# Patient Record
Sex: FEMALE | Race: WHITE | URBAN - METROPOLITAN AREA
[De-identification: names, ages, dates, MRNs, and addresses within clinical notes are randomized per-mention and may not be internally consistent; named-entity substitution may affect disease eponyms.]

---

## 2023-09-12 ENCOUNTER — OFFICE (OUTPATIENT)
Dept: URBAN - METROPOLITAN AREA CLINIC 92 | Facility: CLINIC | Age: 78
Setting detail: OPHTHALMOLOGY
End: 2023-09-12
Payer: MEDICARE

## 2023-09-12 DIAGNOSIS — H40.031: ICD-10-CM

## 2023-09-12 DIAGNOSIS — H02.835: ICD-10-CM

## 2023-09-12 DIAGNOSIS — H16.223: ICD-10-CM

## 2023-09-12 DIAGNOSIS — H01.004: ICD-10-CM

## 2023-09-12 DIAGNOSIS — H01.001: ICD-10-CM

## 2023-09-12 DIAGNOSIS — H25.13: ICD-10-CM

## 2023-09-12 DIAGNOSIS — H02.831: ICD-10-CM

## 2023-09-12 DIAGNOSIS — H40.033: ICD-10-CM

## 2023-09-12 DIAGNOSIS — H40.032: ICD-10-CM

## 2023-09-12 DIAGNOSIS — H02.834: ICD-10-CM

## 2023-09-12 DIAGNOSIS — H02.832: ICD-10-CM

## 2023-09-12 PROCEDURE — 92020 GONIOSCOPY: CPT | Performed by: OPHTHALMOLOGY

## 2023-09-12 PROCEDURE — 92014 COMPRE OPH EXAM EST PT 1/>: CPT | Performed by: OPHTHALMOLOGY

## 2023-09-12 ASSESSMENT — KERATOMETRY
METHOD_AUTO_MANUAL: AUTO
OS_K1POWER_DIOPTERS: 42.75
OD_K2POWER_DIOPTERS: 42.75
OS_K2POWER_DIOPTERS: 42.75
OD_K1POWER_DIOPTERS: 42.00
OD_AXISANGLE_DEGREES: 167
OS_AXISANGLE_DEGREES: 090

## 2023-09-12 ASSESSMENT — REFRACTION_CURRENTRX
OS_AXIS: 180
OD_VPRISM_DIRECTION: SV
OS_SPHERE: +5.25
OS_VPRISM_DIRECTION: SV
OD_CYLINDER: +1.00
OD_SPHERE: +5.75
OD_AXIS: 147
OS_CYLINDER: +0.50
OD_OVR_VA: 20/
OS_OVR_VA: 20/

## 2023-09-12 ASSESSMENT — REFRACTION_AUTOREFRACTION
OD_SPHERE: +4.75
OD_AXIS: 150
OS_SPHERE: +2.25
OD_CYLINDER: +1.00
OS_AXIS: 178
OS_CYLINDER: +1.50

## 2023-09-12 ASSESSMENT — TEAR BREAK UP TIME (TBUT)
OS_TBUT: 1+ 2+
OD_TBUT: 1+ 2+

## 2023-09-12 ASSESSMENT — LID EXAM ASSESSMENTS
OD_BLEPHARITIS: RUL T
OS_BLEPHARITIS: LUL T

## 2023-09-12 ASSESSMENT — VISUAL ACUITY
OD_BCVA: 20/30+
OS_BCVA: 20/25-

## 2023-09-12 ASSESSMENT — SPHEQUIV_DERIVED
OD_SPHEQUIV: 5.25
OS_SPHEQUIV: 3

## 2023-09-12 ASSESSMENT — AXIALLENGTH_DERIVED
OD_AL: 22.0653
OS_AL: 22.7312

## 2023-09-12 ASSESSMENT — LID POSITION - DERMATOCHALASIS
OD_DERMATOCHALASIS: RLL RUL 1+ 2+
OS_DERMATOCHALASIS: LLL LUL 1+ 2+

## 2024-02-17 ENCOUNTER — EMERGENCY (EMERGENCY)
Facility: HOSPITAL | Age: 79
LOS: 1 days | Discharge: HOME | End: 2024-02-17
Admitting: EMERGENCY MEDICINE
Payer: MEDICARE

## 2024-02-17 VITALS
OXYGEN SATURATION: 98 % | SYSTOLIC BLOOD PRESSURE: 178 MMHG | DIASTOLIC BLOOD PRESSURE: 90 MMHG | TEMPERATURE: 98 F | HEART RATE: 86 BPM | RESPIRATION RATE: 16 BRPM

## 2024-02-17 VITALS
RESPIRATION RATE: 18 BRPM | OXYGEN SATURATION: 99 % | DIASTOLIC BLOOD PRESSURE: 80 MMHG | HEART RATE: 70 BPM | SYSTOLIC BLOOD PRESSURE: 192 MMHG | WEIGHT: 108.03 LBS | TEMPERATURE: 99 F

## 2024-02-17 DIAGNOSIS — S82.192A OTHER FRACTURE OF UPPER END OF LEFT TIBIA, INITIAL ENCOUNTER FOR CLOSED FRACTURE: ICD-10-CM

## 2024-02-17 DIAGNOSIS — M16.12 UNILATERAL PRIMARY OSTEOARTHRITIS, LEFT HIP: ICD-10-CM
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
## 2024-02-17 DIAGNOSIS — E03.9 HYPOTHYROIDISM, UNSPECIFIED: ICD-10-CM

## 2024-02-17 DIAGNOSIS — N83.8 OTHER NONINFLAMMATORY DISORDERS OF OVARY, FALLOPIAN TUBE AND BROAD LIGAMENT: ICD-10-CM

## 2024-02-17 DIAGNOSIS — I10 ESSENTIAL (PRIMARY) HYPERTENSION: ICD-10-CM

## 2024-02-17 DIAGNOSIS — M54.2 CERVICALGIA: ICD-10-CM
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
## 2024-02-17 LAB — GLUCOSE BLDC GLUCOMTR-MCNC: 94 MG/DL — SIGNIFICANT CHANGE UP (ref 70–99)

## 2024-02-17 PROCEDURE — 27530 TREAT KNEE FRACTURE: CPT | Mod: 54,LT

## 2024-02-17 PROCEDURE — 72125 CT NECK SPINE W/O DYE: CPT | Mod: 26,MH

## 2024-02-17 PROCEDURE — 73590 X-RAY EXAM OF LOWER LEG: CPT | Mod: 26,LT

## 2024-02-17 PROCEDURE — 72192 CT PELVIS W/O DYE: CPT | Mod: 26,MH

## 2024-02-17 PROCEDURE — 70450 CT HEAD/BRAIN W/O DYE: CPT | Mod: 26,MH

## 2024-02-17 PROCEDURE — 73502 X-RAY EXAM HIP UNI 2-3 VIEWS: CPT | Mod: 26,LT

## 2024-02-17 PROCEDURE — 99285 EMERGENCY DEPT VISIT HI MDM: CPT | Mod: 57

## 2024-02-17 RX ORDER — ACETAMINOPHEN 500 MG
650 TABLET ORAL ONCE
Refills: 0 | Status: COMPLETED | OUTPATIENT
Start: 2024-02-17 | End: 2024-02-17

## 2024-02-17 RX ORDER — AMLODIPINE BESYLATE 2.5 MG/1
5 TABLET ORAL ONCE
Refills: 0 | Status: COMPLETED | OUTPATIENT
Start: 2024-02-17 | End: 2024-02-17

## 2024-02-17 RX ORDER — DICLOFENAC SODIUM 75 MG/1
1 TABLET, DELAYED RELEASE ORAL
Qty: 15 | Refills: 0
Start: 2024-02-17

## 2024-02-17 RX ORDER — ACETAMINOPHEN 500 MG
2 TABLET ORAL
Qty: 20 | Refills: 0
Start: 2024-02-17

## 2024-02-17 RX ADMIN — Medication 650 MILLIGRAM(S): at 19:58

## 2024-02-17 RX ADMIN — AMLODIPINE BESYLATE 5 MILLIGRAM(S): 2.5 TABLET ORAL at 19:58

## 2024-02-17 RX ADMIN — Medication 650 MILLIGRAM(S): at 22:51

## 2024-02-17 NOTE — ED PROVIDER NOTE - PROGRESS NOTE DETAILS
received s/o from ZIA nuñez pending imaging. 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 ambulatory with steady gait, NV intact, +SILT, and FROM otherwise with soft compartment. ace wrap and knee immobilizer applied, cane provided, weight bear as tolerated, f/u with ortho and PMD, pt verbalized understanding   - ZIA Beckford received s/o from ZIA nuñez pending imaging. 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 ambulatory with steady gait, NV intact, +SILT, and FROM otherwise with soft compartment. ace wrap and knee immobilizer applied, cane provided, weight bear as tolerated. Incidental findings of L adnexal cyst noted on CT as well, pt reports no pelvic/abd pain with no change in urinary/bowel function noted, reports provided and instructed f/u with ortho and PMD/GYN, pt verbalized understanding   - ZIA Beckford

## 2024-02-17 NOTE — ED PROVIDER NOTE - PATIENT PORTAL LINK FT
You can access the FollowMyHealth Patient Portal offered by [facility] by registering at the following website: [email or web address]. By joining [employer]’s FollowMyHealth portal, you will also be able to view your health information using other applications (apps) compatible with our system.

## 2024-02-17 NOTE — ED PROVIDER NOTE - PHYSICAL EXAMINATION
VITAL SIGNS: I have reviewed nursing notes and confirm.  CONSTITUTIONAL: Well-developed; well-nourished; in no acute distress.  SKIN: Skin is warm and dry, no acute rash.  HEAD: Normocephalic; atraumatic.  NECK: Supple; +mild ttp along the lateral sides of the trapezius w/o midline spine ttp and FROM.  CARD: S1, S2 normal; no murmurs, gallops, or rubs. Regular rate and rhythm.  RESP: No wheezes, rales or rhonchi.  ABD: Soft; non-distended; non-tender; no rebound or guarding.  EXT: +mild ttp along the L lateral hip and pain reproduced w/ movement. +mild ttp along the lateral L lower leg. Normal ROM. No clubbing, cyanosis or edema.  NEURO: Alert, oriented. Grossly unremarkable. ROMAN, normal tone, no gross motor or sensory changes. Fluent speech.  Gait intact.  PSYCH: Cooperative, appropriate. Mood and affect wnl.

## 2024-02-17 NOTE — ED PROVIDER NOTE - OBJECTIVE STATEMENT
78-year-old female, past medical history of hypothyroidism and hypertension, presenting to the emergency department after mechanical fall down an escalator today.  As per patient's friend who was with her, she is unclear if the patient hit her head, but states there were multiple individuals who attempted to slow her fall down.  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  At time of exam, patient reports some mild pain along the bilateral sides of the neck, but denies headache, dizziness, changes in vision, nausea, vomiting, chest pain or shortness of breath.  She has attempted ambulating after the fall, and has been able to.

## 2024-02-17 NOTE — ED PROVIDER NOTE - CARE PROVIDER_API CALL
Jana Hinds  Orthopaedic Surgery  [address]  [place] [address]  Phone: [phone]  Fax: [phone]  Follow Up Time:     Miguelangel Moyer  Obstetrics and Gynecology  [address] [address]  Phone: [phone]  Fax: [phone]  Follow Up Time:     your PMD,   Phone: (   )    -  Fax: (   )    -  Follow Up Time:

## 2024-02-17 NOTE — ED PROVIDER NOTE - NSFOLLOWUPINSTRUCTIONS_ED_ALL_ED_FT
Osteoarthritis  A knee joint, showing the effects of osteoarthritis on the cartilage.   Osteoarthritis is a type of arthritis. It refers to joint pain or joint disease. Osteoarthritis affects tissue that covers the ends of bones in joints (cartilage). Cartilage acts as a cushion between the bones and helps them move smoothly. Osteoarthritis occurs when cartilage in the joints gets worn down. Osteoarthritis is sometimes called "wear and tear" arthritis.    Osteoarthritis is the most common form of arthritis. It often occurs in older people. It is a condition that gets worse over time. The joints most often affected by this condition are in the fingers, toes, hips, knees, and spine, including the neck and lower back.    What are the causes?  This condition is caused by the wearing down of cartilage that covers the ends of bones.    What increases the risk?  The following factors may make you more likely to develop this condition:  Being age 50 or older.  Obesity.  Overuse of joints.  Past injury of a joint.  Past surgery on a joint.  Family history of osteoarthritis.  What are the signs or symptoms?  The main symptoms of this condition are pain, swelling, and stiffness in the joint. Other symptoms may include:  An enlarged joint.  More pain and further damage caused by small pieces of bone or cartilage that break off and float inside of the joint.  Small deposits of bone (osteophytes) that grow on the edges of the joint.  A grating or scraping feeling inside the joint when you move it.  Popping or creaking sounds when you move.  Difficulty walking or exercising.  An inability to  items, twist your hand, or control the movements of your hands and fingers.  How is this diagnosed?  This condition may be diagnosed based on:  Your medical history.  A physical exam.  Your symptoms.  X-rays of the affected joints.  Blood tests to rule out other types of arthritis.  How is this treated?  There is no cure for this condition, but treatment can help control pain and improve joint function. Treatment may include a combination of therapies, such as:  Pain relief techniques, such as:  Applying heat and cold to the joint.  Massage.  A form of talk therapy called cognitive behavioral therapy (CBT). This therapy helps you set goals and follow up on the changes that you make.  Medicines for pain and inflammation. The medicines can be taken by mouth or applied to the skin. They include:  NSAIDs, such as ibuprofen.  Prescription medicines.  Strong anti-inflammatory medicines (corticosteroids).  Certain nutritional supplements.  A prescribed exercise program. You may work with a physical therapist.  Assistive devices, such as a brace, wrap, splint, specialized glove, or cane.  A weight control plan.  Surgery, such as:  An osteotomy. This is done to reposition the bones and relieve pain or to remove loose pieces of bone and cartilage.  Joint replacement surgery. You may need this surgery if you have advanced osteoarthritis.  Follow these instructions at home:  Activity    Rest your affected joints as told by your health care provider.  Exercise as told by your provider. The provider may recommend specific types of exercise, such as:  Strengthening exercises. These are done to strengthen the muscles that support joints affected by arthritis.  Aerobic activities. These are exercises, such as brisk walking or water aerobics, that increase your heart rate.  Range-of-motion activities. These help your joints move more easily.  Balance and agility exercises.  Managing pain, stiffness, and swelling    A heating pad being used on the affected joint.  Bag of ice on a towel on the skin.  If told, apply heat to the affected area as often as told by your provider. Use the heat source that your provider recommends, such as a moist heat pack or a heating pad.  If you have a removable assistive device, remove it as told by your provider.  Place a towel between your skin and the heat source. If your provider tells you to keep the assistive device on while you apply heat, place a towel between the assistive device and the heat source.  Leave the heat on for 20–30 minutes.  If told, put ice on the affected area.  If you have a removable assistive device, remove it as told by your provider.  Put ice in a plastic bag.  Place a towel between your skin and the bag. If your provider tells you to keep the assistive device on during icing, place a towel between the assistive device and the bag.  Leave the ice on for 20 minutes, 2–3 times a day.  If your skin turns bright red, remove the ice or heat right away to prevent skin damage. The risk of damage is higher if you cannot feel pain, heat, or cold.  Move your fingers or toes often to reduce stiffness and swelling.  Raise (elevate) the affected area above the level of your heart while you are sitting or lying down.  General instructions    Take over-the-counter and prescription medicines only as told by your provider.  Maintain a healthy weight. Follow instructions from your provider for weight control.  Do not use any products that contain nicotine or tobacco. These products include cigarettes, chewing tobacco, and vaping devices, such as e-cigarettes. If you need help quitting, ask your provider.  Use assistive devices as told by your provider.  Where to find more information  National [place] of Arthritis and Musculoskeletal and Skin Diseases: niams.nih.gov  National [place] on Aging: adithya.nih.gov  American College of Rheumatology: rheumatology.org  Contact a health care provider if:  You have redness, swelling, or a feeling of warmth in a joint that gets worse.  You have a fever along with joint or muscle aches.  You develop a rash.  You have trouble doing your normal activities.  You have pain that gets worse and is not relieved by pain medicine.  This information is not intended to replace advice given to you by your health care provider. Make sure you discuss any questions you have with your health care provider.    Ovarian Cyst    An ovarian cyst is a fluid-filled sac that forms on an ovary. The ovaries are small organs that produce eggs in women. Various types of cysts can form on the ovaries. Some may cause symptoms and require treatment. Most ovarian cysts go away on their own, are not cancerous (are benign), and do not cause problems.    What are the causes?  Ovarian cysts may be caused by:  Ovarian hyperstimulation syndrome. This is a condition that can develop from taking fertility medicines. It causes multiple large ovarian cysts to form.  Polycystic ovarian syndrome (PCOS). This is a common hormonal disorder that can cause ovarian cysts to form, and can cause problems with your period or fertility.  The normal menstrual cycle.  What increases the risk?  The following factors may make you more likely to develop this condition:  Being overweight or obese.  Taking fertility medicines.  Taking certain forms of hormonal birth control.  Smoking.  What are the signs or symptoms?  Many ovarian cysts do not cause symptoms. If symptoms are present, they may include:  Pelvic pain or pressure.  Pain in the lower abdomen.  Pain during sex.  Abdominal swelling.  Abnormal menstrual periods.  Increasing pain with menstrual periods.  How is this diagnosed?  These cysts are commonly found during a routine pelvic exam. You may have tests to find out more about the cyst, such as:  Ultrasound.  CT scan.  MRI.  Blood tests.  How is this treated?  Many ovarian cysts go away on their own without treatment. Your health care provider may want to check your cyst regularly for 2–3 months to see if it changes. If you are in menopause, it is especially important to have your cyst monitored closely because menopausal women have a higher rate of ovarian cancer.    When treatment is needed, it may include:  Medicines to help relieve pain.  A procedure to drain the cyst (aspiration).  Surgery to remove the whole cyst (cystectomy).  Hormone treatment or birth control pills. These methods are sometimes used to help keep cysts from coming back.  Surgery to remove the ovary (oophorectomy).  Follow these instructions at home:  Take over-the-counter and prescription medicines only as told by your health care provider.  Ask your health care provider if any medicine prescribed to you requires you to avoid driving or using machinery.  Get regular pelvic exams and Pap tests as often as told by your health care provider.  Return to your normal activities as told by your health care provider. Ask your health care provider what activities are safe for you.  Do not use any products that contain nicotine or tobacco, such as cigarettes, e-cigarettes, and chewing tobacco. If you need help quitting, ask your health care provider.  Keep all follow-up visits. This is important.  Contact a health care provider if:  Your periods are late, irregular, painful, or they stop.  You have pelvic pain that does not go away.  You have pressure on your bladder or trouble emptying your bladder completely.  You have any of the following:  A feeling of fullness.  You are gaining weight or losing weight without changing your exercise and eating habits.  Pain, swelling, or bloating in the abdomen.  Loss of appetite.  Pain and pressure in your back and pelvis.  You think you may be pregnant.  Get help right away if:  You have abdominal or pelvic pain that is severe or gets worse.  You cannot eat or drink without vomiting.  You suddenly develop a fever or chills.  Your menstrual period is much heavier than usual.  Summary  An ovarian cyst is a fluid-filled sac that forms on an ovary.  Some ovarian cysts may cause symptoms and require treatment.  These cysts are commonly found during a routine pelvic exam.  Many ovarian cysts go away on their own without treatment.  This information is not intended to replace advice given to you by your health care provider. Make sure you discuss any questions you have with your health care provider.    Hypertension    Hypertension, commonly called high blood pressure, is when the force of blood pumping through your arteries is too strong. Hypertension forces your heart to work harder to pump blood. Your arteries may become narrow or stiff. Having untreated or uncontrolled hypertension for a long period of time can cause heart attack, stroke, kidney disease, and other problems. If started on a medication, take exactly as prescribed by your health care professional. Maintain a healthy lifestyle and follow up with your primary care physician.    SEEK IMMEDIATE MEDICAL CARE IF YOU HAVE ANY OF THE FOLLOWING SYMPTOMS: severe headache, confusion, chest pain, abdominal pain, vomiting, or shortness of breath.

## 2024-02-17 NOTE — ED ADULT TRIAGE NOTE - CHIEF COMPLAINT QUOTE
here for mechanical fall but ( friend caught her ) pt was found to be hypertensive - pt denies change in vision, n/v/ or dizziness

## 2024-02-17 NOTE — ED PROVIDER NOTE - CLINICAL SUMMARY MEDICAL DECISION MAKING FREE TEXT BOX
78-year-old female, past medical history of hypothyroidism and hypertension, presenting to the emergency department after mechanical fall down an escalator today. No scalp hematoma or lesions on exam.  Patient is noted to be neurologically intact on exam and she has a steady gait in the ED as well.  Will plan to obtain CT head, cervical spine, x-rays of the left hip and left tib-fib region.  P.o. Tylenol also given for pain.  Patient found to have elevated BP in the field by EMS with a systolic in the 200s.  Upon arriving to the emergency department patient's blood pressure was 191/80.  She cannot recall the medications patient takes for blood pressure.  I was able to find out that her primary care doctor is affiliated through [facility].  After calling [facility] emergency department, they were able to identify the patient had previously been on amlodipine 5 mg and Toprol XL 50 mg both daily.  Patient however is unsure which of the 2 medication she is taking, or if she is taking both medications.  At this time, will give patient 5 mg of amlodipine.  EKG obtained is does not show any evidence of an acute ischemic event.  Patient denies any chest pain, shortness of breath, dizziness, or changes in vision.  Will reassess blood pressure to see if there are any acute changes or improvement.  Patient is recommended to keep a small record of all of her medications to carry with her whenever she leaves the house.  To ensure that she can confirm the medications she takes regularly.  Will reassess and dispo pending medical workup.

## 2024-02-17 NOTE — ED PROVIDER NOTE - NS ED ROS FT
+fall  +neck pain  +L hip pain  Denies fevers, chills, nausea, vomiting, diarrhea, constipation, abdominal pain, urinary symptoms, chest pain, palpitations, shortness of breath, dyspnea on exertion, syncope/near syncope, cough/URI symptoms, headache, weakness, numbness, focal deficits, visual changes, dizziness

## 2024-02-17 NOTE — ED PROVIDER NOTE - CARE PROVIDERS DIRECT ADDRESSES
[email or web address]

## 2024-02-17 NOTE — ED PROVIDER NOTE - PROVIDER TOKENS
PROVIDER:[TOKEN:[[id number]]],PROVIDER:[TOKEN:[[id number]]],FREE:[LAST:[your PMD],PHONE:[(   )    -],FAX:[(   )    -]]

## 2024-02-17 NOTE — ED ADULT NURSE NOTE - OBJECTIVE STATEMENT
s/p mechanical fall on up escalator, patient's fall broken and landed on left side, denied hitting head, - LOC, abrasion noted to left calf, no bleeding. No bumps or obvious signs of injury to head. Edema noted to b/l lower extremities.

## 2024-02-17 NOTE — ED PROVIDER NOTE - CARE PLAN
1 Principal Discharge DX:	Fall  Secondary Diagnosis:	Osteoarthritis  Secondary Diagnosis:	Knee pain  Secondary Diagnosis:	Avulsion fracture  Secondary Diagnosis:	Adnexal cyst

## 2024-02-17 NOTE — ED ADULT NURSE NOTE - NSFALLRISKINTERV_ED_ALL_ED

## 2025-02-18 ENCOUNTER — OFFICE (OUTPATIENT)
Dept: URBAN - METROPOLITAN AREA CLINIC 28 | Facility: CLINIC | Age: 80
Setting detail: OPHTHALMOLOGY
End: 2025-02-18
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
## 2025-02-18 PROCEDURE — NO SHOW FE NO SHOW FEE: Performed by: OPHTHALMOLOGY
